# Patient Record
Sex: MALE | Race: WHITE | URBAN - METROPOLITAN AREA
[De-identification: names, ages, dates, MRNs, and addresses within clinical notes are randomized per-mention and may not be internally consistent; named-entity substitution may affect disease eponyms.]

---

## 2024-01-18 ENCOUNTER — OFFICE (OUTPATIENT)
Dept: URBAN - METROPOLITAN AREA CLINIC 12 | Facility: CLINIC | Age: 59
Setting detail: OPHTHALMOLOGY
End: 2024-01-18
Payer: COMMERCIAL

## 2024-01-18 DIAGNOSIS — H52.13: ICD-10-CM

## 2024-01-18 DIAGNOSIS — H43.393: ICD-10-CM

## 2024-01-18 PROCEDURE — SCREF LASIK EVAL: Performed by: OPHTHALMOLOGY

## 2024-01-18 ASSESSMENT — REFRACTION_CURRENTRX
OD_OVR_VA: 20/
OS_SPHERE: +4.00
OS_AXIS: 116
OD_AXIS: 097
OD_CYLINDER: -0.50
OS_VPRISM_DIRECTION: SV
OD_VPRISM_DIRECTION: SV
OS_CYLINDER: -0.25
OD_SPHERE: +4.25
OS_OVR_VA: 20/

## 2024-01-18 ASSESSMENT — SPHEQUIV_DERIVED
OS_SPHEQUIV: 1.875
OD_SPHEQUIV: 1.875
OS_SPHEQUIV: 1.875
OD_SPHEQUIV: 1.875

## 2024-01-18 ASSESSMENT — REFRACTION_MANIFEST
OD_AXIS: 100
OD_VA1: 20/20
OS_AXIS: 095
OS_SPHERE: +2.00
OD_SPHERE: +2.25
OD_CYLINDER: -0.75
OS_CYLINDER: -0.25
OS_VA1: 20/20

## 2024-01-18 ASSESSMENT — REFRACTION_AUTOREFRACTION
OS_CYLINDER: -0.25
OD_SPHERE: +2.25
OS_SPHERE: +2.00
OD_AXIS: 102
OD_CYLINDER: -0.75
OS_AXIS: 096

## 2024-01-18 ASSESSMENT — CONFRONTATIONAL VISUAL FIELD TEST (CVF)
OS_FINDINGS: FULL
OD_FINDINGS: FULL

## 2024-01-23 ENCOUNTER — OFFICE (OUTPATIENT)
Dept: URBAN - METROPOLITAN AREA CLINIC 12 | Facility: CLINIC | Age: 59
Setting detail: OPHTHALMOLOGY
End: 2024-01-23
Payer: COMMERCIAL

## 2024-01-23 DIAGNOSIS — H52.13: ICD-10-CM

## 2024-01-23 PROCEDURE — SCREF LASIK EVAL: Performed by: OPHTHALMOLOGY

## 2024-01-23 ASSESSMENT — SPHEQUIV_DERIVED
OD_SPHEQUIV: 1.75
OS_SPHEQUIV: 1.875
OD_SPHEQUIV: 2
OD_SPHEQUIV: 1.875
OS_SPHEQUIV: 1.495
OS_SPHEQUIV: 1.75

## 2024-01-23 ASSESSMENT — REFRACTION_CURRENTRX
OS_OVR_VA: 20/
OS_AXIS: 116
OD_SPHERE: +4.25
OS_SPHERE: +4.00
OS_CYLINDER: -0.25
OD_VPRISM_DIRECTION: SV
OS_VPRISM_DIRECTION: SV
OD_AXIS: 097
OD_OVR_VA: 20/
OD_CYLINDER: -0.50

## 2024-01-23 ASSESSMENT — REFRACTION_AUTOREFRACTION
OD_AXIS: 104
OS_CYLINDER: -0.50
OS_AXIS: 102
OD_CYLINDER: -1.00
OS_SPHERE: +2.00
OD_SPHERE: +2.50

## 2024-01-23 ASSESSMENT — CONFRONTATIONAL VISUAL FIELD TEST (CVF)
OS_FINDINGS: FULL
OD_FINDINGS: FULL

## 2024-01-23 ASSESSMENT — REFRACTION_MANIFEST
OD_CYLINDER: -0.75
OS_SPHERE: +1.62
OS_AXIS: 095
OS_VA1: 20/20-2
OD_VA1: 20/20
OS_CYLINDER: -0.25
OD_SPHERE: +2.00
OD_SPHERE: +2.25
OD_VA1: 20/20-2
OS_SPHERE: +2.00
OD_AXIS: 110
OD_CYLINDER: -0.50
OS_AXIS: 65
OS_VA1: 20/20
OS_CYLINDER: -0.25
OD_AXIS: 100

## 2024-01-26 ENCOUNTER — OTHER LOCATION (OUTPATIENT)
Dept: URBAN - METROPOLITAN AREA LASIK CENTER 6 | Facility: LASIK CENTER | Age: 59
Setting detail: OPHTHALMOLOGY
End: 2024-01-26

## 2024-01-26 ENCOUNTER — RX ONLY (RX ONLY)
Age: 59
End: 2024-01-26

## 2024-01-26 DIAGNOSIS — H52.13: ICD-10-CM

## 2024-01-26 PROCEDURE — 65760 KERATOMILEUSIS: CPT | Mod: RT,LT | Performed by: OPHTHALMOLOGY

## 2024-01-26 ASSESSMENT — REFRACTION_AUTOREFRACTION
OD_CYLINDER: -1.00
OS_SPHERE: +2.00
OD_AXIS: 104
OS_CYLINDER: -0.50
OD_SPHERE: +2.50
OS_AXIS: 102

## 2024-01-26 ASSESSMENT — REFRACTION_MANIFEST
OS_AXIS: 095
OS_CYLINDER: -0.25
OD_AXIS: 100
OS_CYLINDER: -0.25
OD_SPHERE: +2.00
OD_CYLINDER: -0.50
OD_VA1: 20/20
OS_SPHERE: +1.62
OD_AXIS: 110
OS_SPHERE: +2.00
OD_SPHERE: +2.25
OD_VA1: 20/20-2
OS_VA1: 20/20-2
OS_AXIS: 65
OD_CYLINDER: -0.75
OS_VA1: 20/20

## 2024-01-26 ASSESSMENT — SPHEQUIV_DERIVED
OD_SPHEQUIV: 2
OS_SPHEQUIV: 1.875
OS_SPHEQUIV: 1.495
OD_SPHEQUIV: 1.75
OS_SPHEQUIV: 1.75
OD_SPHEQUIV: 1.875

## 2024-01-26 ASSESSMENT — REFRACTION_CURRENTRX
OD_SPHERE: +4.25
OD_AXIS: 097
OS_AXIS: 116
OS_VPRISM_DIRECTION: SV
OD_OVR_VA: 20/
OS_CYLINDER: -0.25
OS_OVR_VA: 20/
OD_VPRISM_DIRECTION: SV
OS_SPHERE: +4.00
OD_CYLINDER: -0.50

## 2024-01-27 ENCOUNTER — OFFICE (OUTPATIENT)
Dept: URBAN - METROPOLITAN AREA CLINIC 12 | Facility: CLINIC | Age: 59
Setting detail: OPHTHALMOLOGY
End: 2024-01-27
Payer: COMMERCIAL

## 2024-01-27 DIAGNOSIS — H52.13: ICD-10-CM

## 2024-01-27 PROCEDURE — 99024 POSTOP FOLLOW-UP VISIT: CPT | Performed by: OPHTHALMOLOGY

## 2024-01-27 ASSESSMENT — SPHEQUIV_DERIVED
OD_SPHEQUIV: 1.875
OS_SPHEQUIV: -0.875
OD_SPHEQUIV: -0.5
OS_SPHEQUIV: 1.495
OD_SPHEQUIV: 1.75
OS_SPHEQUIV: 1.875

## 2024-01-27 ASSESSMENT — REFRACTION_AUTOREFRACTION
OD_AXIS: 177
OS_SPHERE: -0.50
OD_SPHERE: -0.25
OS_AXIS: 003
OS_CYLINDER: -0.75
OD_CYLINDER: -0.50

## 2024-01-27 ASSESSMENT — REFRACTION_CURRENTRX
OD_CYLINDER: -0.50
OS_AXIS: 116
OS_CYLINDER: -0.25
OS_OVR_VA: 20/
OS_SPHERE: +4.00
OD_AXIS: 097
OD_SPHERE: +4.25
OD_OVR_VA: 20/
OS_VPRISM_DIRECTION: SV
OD_VPRISM_DIRECTION: SV

## 2024-01-27 ASSESSMENT — REFRACTION_MANIFEST
OD_CYLINDER: -0.75
OD_CYLINDER: -0.50
OS_AXIS: 095
OS_VA1: 20/20
OD_SPHERE: +2.25
OD_AXIS: 110
OS_SPHERE: +2.00
OS_CYLINDER: -0.25
OS_SPHERE: +1.62
OS_AXIS: 65
OD_SPHERE: +2.00
OD_AXIS: 100
OS_VA1: 20/20-2
OS_CYLINDER: -0.25
OD_VA1: 20/20-2
OD_VA1: 20/20

## 2024-01-27 ASSESSMENT — CONFRONTATIONAL VISUAL FIELD TEST (CVF)
OD_FINDINGS: FULL
OS_FINDINGS: FULL

## 2024-02-02 ENCOUNTER — OFFICE (OUTPATIENT)
Dept: URBAN - METROPOLITAN AREA CLINIC 12 | Facility: CLINIC | Age: 59
Setting detail: OPHTHALMOLOGY
End: 2024-02-02
Payer: COMMERCIAL

## 2024-02-02 DIAGNOSIS — H52.13: ICD-10-CM

## 2024-02-02 PROBLEM — H43.393 VITREOUS FLOATERS; BOTH EYES: Status: ACTIVE | Noted: 2024-01-18

## 2024-02-02 PROCEDURE — 99024 POSTOP FOLLOW-UP VISIT: CPT | Performed by: OPHTHALMOLOGY

## 2024-02-02 ASSESSMENT — REFRACTION_MANIFEST
OS_SPHERE: +1.62
OD_AXIS: 100
OD_CYLINDER: -0.50
OD_VA1: 20/20
OD_VA1: 20/20-2
OD_CYLINDER: -0.75
OS_AXIS: 65
OS_CYLINDER: -0.25
OS_SPHERE: +2.00
OS_VA1: 20/20
OD_SPHERE: +2.25
OS_AXIS: 095
OS_VA1: 20/20-2
OD_AXIS: 110
OD_SPHERE: +2.00
OS_CYLINDER: -0.25

## 2024-02-02 ASSESSMENT — REFRACTION_CURRENTRX
OD_OVR_VA: 20/
OS_VPRISM_DIRECTION: SV
OS_SPHERE: +4.00
OD_AXIS: 097
OD_VPRISM_DIRECTION: SV
OS_CYLINDER: -0.25
OD_CYLINDER: -0.50
OS_OVR_VA: 20/
OD_SPHERE: +4.25
OS_AXIS: 116

## 2024-02-02 ASSESSMENT — SPHEQUIV_DERIVED
OS_SPHEQUIV: -1.375
OD_SPHEQUIV: 1.875
OS_SPHEQUIV: 1.495
OD_SPHEQUIV: -0.625
OS_SPHEQUIV: 1.875
OD_SPHEQUIV: 1.75

## 2024-02-02 ASSESSMENT — REFRACTION_AUTOREFRACTION
OD_SPHERE: -0.50
OD_AXIS: 022
OD_CYLINDER: -0.25
OS_CYLINDER: -1.75
OS_SPHERE: -0.50
OS_AXIS: 158

## 2024-02-02 ASSESSMENT — CONFRONTATIONAL VISUAL FIELD TEST (CVF)
OS_FINDINGS: FULL
OD_FINDINGS: FULL

## 2024-03-05 ENCOUNTER — OFFICE (OUTPATIENT)
Dept: URBAN - METROPOLITAN AREA CLINIC 12 | Facility: CLINIC | Age: 59
Setting detail: OPHTHALMOLOGY
End: 2024-03-05
Payer: COMMERCIAL

## 2024-03-05 DIAGNOSIS — H52.13: ICD-10-CM

## 2024-03-05 PROCEDURE — 99024 POSTOP FOLLOW-UP VISIT: CPT | Performed by: OPHTHALMOLOGY

## 2024-03-05 ASSESSMENT — SPHEQUIV_DERIVED
OD_SPHEQUIV: 1.75
OD_SPHEQUIV: 1.875
OS_SPHEQUIV: 1.875
OS_SPHEQUIV: 1.495

## 2024-03-05 ASSESSMENT — REFRACTION_CURRENTRX
OD_OVR_VA: 20/
OS_OVR_VA: 20/
OS_SPHERE: +4.00
OS_CYLINDER: -0.25
OS_AXIS: 116
OD_AXIS: 097
OD_SPHERE: +4.25
OD_VPRISM_DIRECTION: SV
OD_CYLINDER: -0.50
OS_VPRISM_DIRECTION: SV

## 2024-03-05 ASSESSMENT — REFRACTION_MANIFEST
OS_VA1: 20/20
OS_AXIS: 095
OS_VA1: 20/20-2
OD_AXIS: 100
OS_SPHERE: +1.62
OD_CYLINDER: -0.50
OS_AXIS: 65
OD_SPHERE: +2.25
OD_SPHERE: +2.00
OS_CYLINDER: -0.25
OS_SPHERE: +2.00
OD_VA1: 20/20
OD_AXIS: 110
OD_CYLINDER: -0.75
OD_VA1: 20/20-2
OS_CYLINDER: -0.25

## 2024-05-01 ENCOUNTER — APPOINTMENT (OUTPATIENT)
Dept: ORTHOPEDIC SURGERY | Facility: CLINIC | Age: 59
End: 2024-05-01
Payer: COMMERCIAL

## 2024-05-01 VITALS — WEIGHT: 180 LBS | BODY MASS INDEX: 26.66 KG/M2 | HEIGHT: 69 IN

## 2024-05-01 DIAGNOSIS — Z78.9 OTHER SPECIFIED HEALTH STATUS: ICD-10-CM

## 2024-05-01 DIAGNOSIS — Z86.79 PERSONAL HISTORY OF OTHER DISEASES OF THE CIRCULATORY SYSTEM: ICD-10-CM

## 2024-05-01 DIAGNOSIS — S93.509A UNSPECIFIED SPRAIN OF UNSPECIFIED TOE(S), INITIAL ENCOUNTER: ICD-10-CM

## 2024-05-01 PROBLEM — Z00.00 ENCOUNTER FOR PREVENTIVE HEALTH EXAMINATION: Status: ACTIVE | Noted: 2024-05-01

## 2024-05-01 PROCEDURE — 99214 OFFICE O/P EST MOD 30 MIN: CPT

## 2024-05-01 PROCEDURE — 73630 X-RAY EXAM OF FOOT: CPT | Mod: LT

## 2024-05-01 NOTE — HISTORY OF PRESENT ILLNESS
[Sudden] : sudden [Dull/Aching] : dull/aching [Throbbing] : throbbing [Intermittent] : intermittent [Household chores] : household chores [Leisure] : leisure [Social interactions] : social interactions [Rest] : rest [3] : 3 [0] : 0 [Full time] : Work status: full time [de-identified] : Patient here for left foot. Patient states he was running and fell into a cabinet 7 weeks ago. Patient states he has pain in the base of his 2nd and 3rd toes. Patient states he gets an aching pain with weight bearing.  [] : Post Surgical Visit: no [FreeTextEntry1] : Left foot  [FreeTextEntry3] : 7 weeks ago  [FreeTextEntry5] : Patient states he was running and fell into a cabinet [de-identified] : Movement  [de-identified] : [place] [employer]

## 2024-05-01 NOTE — PHYSICAL EXAM
[de-identified] : Examination of the left foot and ankle is as follows: INSPECTION: no swelling, no abrasion, no laceration, no erythema, no ecchymosis, no gross deformity PALPATION: mildly ttp over 2nd and 3rd plantar MTP joints, ttp over 3rd PIP joint ROM: dorsiflexion 20 degrees, plantar flexion 40 degrees, inversion 30 degrees, eversion 20 degrees. STRENGTH: dorsiflexion 5/5. plantar flexion 5/5, inversion 5/5, eversion 5/5, EHL 4/5, FHL 4/5 VASCULAR: dorsalis pedis pulse: 2+, posterior tibialis pulse: 2+ NEURO: Sensation present to light touch in all distributions GAIT: non-antalgic, but patient ambulates without assistive device  X-rays of the left foot is as follows: Foot 3 view: There are no fractures, subluxations or dislocations. No significant abnormalities are seen.

## 2024-05-01 NOTE — ASSESSMENT
[FreeTextEntry1] : 59 yo male presenting with resolving left 2nd and 3rd toe sprains. X-rays negative for fractures. Patient reporting that his pain is overall tolerable. Recommend non-surgical management. -Activities as tolerated, no restrictions -Rest, ice, compression, elevation, NSAIDs PRN for pain.  -All questions answered -F/u PRN  The diagnosis was explained in detail. The potential non-surgical and surgical treatments were reviewed. The relative risks and benefits of each option were considered relative to the patients age, activity level, medical history, symptom severity and previously attempted treatments.  The patient was advised to consult with their primary medical provider prior to initiation of any new medications to reduce the risk of adverse effects specific to their long-term home medications and medical history. The risk of gastrointestinal irritation and kidney injury specific to long-term NSAID use was discussed.  Entered by Al Randall PA-C acting as scribe. Dr. Cintron Attestation The documentation recorded by the scribe, in my presence, accurately reflects the service I, Dr. Cintron, personally performed, and the decisions made by me with my edits as appropriate.

## 2024-06-04 ENCOUNTER — RX ONLY (RX ONLY)
Age: 59
End: 2024-06-04

## 2024-06-04 ENCOUNTER — OFFICE (OUTPATIENT)
Dept: URBAN - METROPOLITAN AREA CLINIC 12 | Facility: CLINIC | Age: 59
Setting detail: OPHTHALMOLOGY
End: 2024-06-04
Payer: COMMERCIAL

## 2024-06-04 DIAGNOSIS — H52.13: ICD-10-CM

## 2024-06-04 PROCEDURE — 99024 POSTOP FOLLOW-UP VISIT: CPT | Performed by: OPHTHALMOLOGY

## 2024-06-04 ASSESSMENT — CONFRONTATIONAL VISUAL FIELD TEST (CVF)
OS_FINDINGS: FULL
OD_FINDINGS: FULL

## 2024-12-03 ENCOUNTER — OFFICE (OUTPATIENT)
Dept: URBAN - METROPOLITAN AREA CLINIC 12 | Facility: CLINIC | Age: 59
Setting detail: OPHTHALMOLOGY
End: 2024-12-03
Payer: COMMERCIAL

## 2024-12-03 DIAGNOSIS — H52.13: ICD-10-CM

## 2024-12-03 PROCEDURE — 99024 POSTOP FOLLOW-UP VISIT: CPT | Performed by: OPHTHALMOLOGY

## 2024-12-03 ASSESSMENT — KERATOMETRY
OD_K2POWER_DIOPTERS: 44.25
OS_AXISANGLE_DEGREES: 046
OS_K1POWER_DIOPTERS: 44.75
OS_K2POWER_DIOPTERS: 45.00
OD_K1POWER_DIOPTERS: 44.00
OD_AXISANGLE_DEGREES: 100

## 2024-12-03 ASSESSMENT — TONOMETRY
OD_IOP_MMHG: 10
OS_IOP_MMHG: 13

## 2024-12-03 ASSESSMENT — REFRACTION_AUTOREFRACTION
OD_CYLINDER: SPHERE
OD_AXIS: 000
OS_CYLINDER: -1.25
OS_SPHERE: +0.50
OS_AXIS: 143
OD_SPHERE: -0.25

## 2024-12-03 ASSESSMENT — REFRACTION_MANIFEST
OD_VA1: 20/20-2
OD_CYLINDER: -0.50
OS_CYLINDER: -0.25
OD_VA1: 20/20
OS_SPHERE: +1.62
OS_SPHERE: +2.00
OS_VA1: 20/20-2
OD_SPHERE: +2.00
OS_CYLINDER: -0.25
OD_AXIS: 100
OD_AXIS: 110
OD_CYLINDER: -0.75
OD_SPHERE: +2.25
OS_VA1: 20/20
OS_AXIS: 095
OS_AXIS: 65

## 2024-12-03 ASSESSMENT — REFRACTION_CURRENTRX
OS_OVR_VA: 20/
OD_SPHERE: +4.25
OD_CYLINDER: -0.50
OS_SPHERE: +4.00
OS_CYLINDER: -0.25
OD_AXIS: 097
OS_VPRISM_DIRECTION: SV
OD_OVR_VA: 20/
OS_AXIS: 116
OD_VPRISM_DIRECTION: SV

## 2024-12-03 ASSESSMENT — VISUAL ACUITY
OS_BCVA: 20/30-1
OD_BCVA: 20/40

## 2024-12-03 ASSESSMENT — CONFRONTATIONAL VISUAL FIELD TEST (CVF)
OS_FINDINGS: FULL
OD_FINDINGS: FULL